# Patient Record
Sex: MALE | Race: WHITE | Employment: OTHER | URBAN - METROPOLITAN AREA
[De-identification: names, ages, dates, MRNs, and addresses within clinical notes are randomized per-mention and may not be internally consistent; named-entity substitution may affect disease eponyms.]

---

## 2017-04-07 ENCOUNTER — TRANSFERRED RECORDS (OUTPATIENT)
Dept: HEALTH INFORMATION MANAGEMENT | Facility: CLINIC | Age: 77
End: 2017-04-07

## 2017-12-13 ENCOUNTER — TELEPHONE (OUTPATIENT)
Dept: FAMILY MEDICINE | Facility: CLINIC | Age: 77
End: 2017-12-13

## 2017-12-13 NOTE — TELEPHONE ENCOUNTER
12/13/2017    Call Regarding ReattributionPhysical    Attempt 1    Message on voicemail    Comments:       Outreach   Bell Raya

## 2018-02-21 NOTE — TELEPHONE ENCOUNTER
2/21/2018    Call Regarding ReattributionPhysical       Attempt 2    Message on voicemail     Comments:       Outreach   Aliyah Ruiz

## 2018-03-17 NOTE — TELEPHONE ENCOUNTER
3/16/2018    Call Regarding ReattributionPhysical       Attempt 3    Message on voicemail     Comments:       Outreach   SV

## 2018-06-26 ENCOUNTER — TRANSFERRED RECORDS (OUTPATIENT)
Dept: HEALTH INFORMATION MANAGEMENT | Facility: CLINIC | Age: 78
End: 2018-06-26

## 2018-08-23 ENCOUNTER — OFFICE VISIT (OUTPATIENT)
Dept: FAMILY MEDICINE | Facility: CLINIC | Age: 78
End: 2018-08-23
Payer: COMMERCIAL

## 2018-08-23 VITALS
BODY MASS INDEX: 27.44 KG/M2 | HEART RATE: 52 BPM | WEIGHT: 185.3 LBS | TEMPERATURE: 97.9 F | HEIGHT: 69 IN | RESPIRATION RATE: 14 BRPM | SYSTOLIC BLOOD PRESSURE: 112 MMHG | DIASTOLIC BLOOD PRESSURE: 70 MMHG | OXYGEN SATURATION: 97 %

## 2018-08-23 DIAGNOSIS — Z12.5 SCREENING FOR PROSTATE CANCER: ICD-10-CM

## 2018-08-23 DIAGNOSIS — Z13.6 CARDIOVASCULAR SCREENING; LDL GOAL LESS THAN 130: ICD-10-CM

## 2018-08-23 DIAGNOSIS — Z00.00 ROUTINE GENERAL MEDICAL EXAMINATION AT A HEALTH CARE FACILITY: Primary | ICD-10-CM

## 2018-08-23 LAB
ALBUMIN SERPL-MCNC: 4.1 G/DL (ref 3.4–5)
ALP SERPL-CCNC: 65 U/L (ref 40–150)
ALT SERPL W P-5'-P-CCNC: 18 U/L (ref 0–70)
ANION GAP SERPL CALCULATED.3IONS-SCNC: 6 MMOL/L (ref 3–14)
AST SERPL W P-5'-P-CCNC: 14 U/L (ref 0–45)
BILIRUB SERPL-MCNC: 1.7 MG/DL (ref 0.2–1.3)
BUN SERPL-MCNC: 18 MG/DL (ref 7–30)
CALCIUM SERPL-MCNC: 8.7 MG/DL (ref 8.5–10.1)
CHLORIDE SERPL-SCNC: 105 MMOL/L (ref 94–109)
CHOLEST SERPL-MCNC: 183 MG/DL
CO2 SERPL-SCNC: 31 MMOL/L (ref 20–32)
CREAT SERPL-MCNC: 0.8 MG/DL (ref 0.66–1.25)
ERYTHROCYTE [DISTWIDTH] IN BLOOD BY AUTOMATED COUNT: 13.6 % (ref 10–15)
GFR SERPL CREATININE-BSD FRML MDRD: >90 ML/MIN/1.7M2
GLUCOSE SERPL-MCNC: 93 MG/DL (ref 70–99)
HCT VFR BLD AUTO: 45.6 % (ref 40–53)
HDLC SERPL-MCNC: 50 MG/DL
HGB BLD-MCNC: 15.1 G/DL (ref 13.3–17.7)
LDLC SERPL CALC-MCNC: 117 MG/DL
MCH RBC QN AUTO: 31.7 PG (ref 26.5–33)
MCHC RBC AUTO-ENTMCNC: 33.1 G/DL (ref 31.5–36.5)
MCV RBC AUTO: 96 FL (ref 78–100)
NONHDLC SERPL-MCNC: 133 MG/DL
PLATELET # BLD AUTO: 210 10E9/L (ref 150–450)
POTASSIUM SERPL-SCNC: 4.1 MMOL/L (ref 3.4–5.3)
PROT SERPL-MCNC: 7.1 G/DL (ref 6.8–8.8)
PSA SERPL-ACNC: 1.28 UG/L (ref 0–4)
RBC # BLD AUTO: 4.76 10E12/L (ref 4.4–5.9)
SODIUM SERPL-SCNC: 142 MMOL/L (ref 133–144)
TRIGL SERPL-MCNC: 81 MG/DL
WBC # BLD AUTO: 6.5 10E9/L (ref 4–11)

## 2018-08-23 PROCEDURE — G0103 PSA SCREENING: HCPCS | Performed by: FAMILY MEDICINE

## 2018-08-23 PROCEDURE — 36415 COLL VENOUS BLD VENIPUNCTURE: CPT | Performed by: FAMILY MEDICINE

## 2018-08-23 PROCEDURE — 80053 COMPREHEN METABOLIC PANEL: CPT | Performed by: FAMILY MEDICINE

## 2018-08-23 PROCEDURE — 85027 COMPLETE CBC AUTOMATED: CPT | Performed by: FAMILY MEDICINE

## 2018-08-23 PROCEDURE — 99397 PER PM REEVAL EST PAT 65+ YR: CPT | Performed by: FAMILY MEDICINE

## 2018-08-23 PROCEDURE — 80061 LIPID PANEL: CPT | Performed by: FAMILY MEDICINE

## 2018-08-23 NOTE — MR AVS SNAPSHOT
After Visit Summary   8/23/2018    Abdon Senior    MRN: [id number]           Patient Information     Date Of Birth          1940        Visit Information        Provider Department      8/23/2018 10:40 AM Rex Calles MD [facility]        Today's Diagnoses     Routine general medical examination at a health care facility    -  1    CARDIOVASCULAR SCREENING; LDL GOAL LESS THAN 130        Screening for prostate cancer          Care Instructions      Preventive Health Recommendations:   Male Ages 65 and over    Yearly exam:             See your health care provider every year in order to  o   Review health changes.   o   Discuss preventive care.    o   Review your medicines if your doctor has prescribed any.    Talk with your health care provider about whether you should have a test to screen for prostate cancer (PSA).    Every 3 years, have a diabetes test (fasting glucose). If you are at risk for diabetes, you should have this test more often.    Every 5 years, have a cholesterol test. Have this test more often if you are at risk for high cholesterol or heart disease.     Every 10 years, have a colonoscopy. Or, have a yearly FIT test (stool test). These exams will check for colon cancer.    Talk to with your health care provider about screening for Abdominal Aortic Aneurysm if you have a family history of AAA or have a history of smoking.    Shots:     Get a flu shot each year.     Get a tetanus shot every 10 years.     Talk to your doctor about your pneumonia vaccines. There are now two you should receive - Pneumovax (PPSV 23) and Prevnar (PCV 13).     Talk to your pharmacist about a shingles vaccine.     Talk to your doctor about the hepatitis B vaccine.  Nutrition:     Eat at least 5 servings of fruits and vegetables each day.     Eat whole-grain bread, whole-wheat pasta and brown rice instead of white grains and rice.     Get adequate Calcium and Vitamin D.  "  Lifestyle    Exercise for at least 150 minutes a week (30 minutes a day, 5 days a week). This will help you control your weight and prevent disease.     Limit alcohol to one drink per day.     No smoking.     Wear sunscreen to prevent skin cancer.     See your dentist every six months for an exam and cleaning.     See your eye doctor every 1 to 2 years to screen for conditions such as glaucoma, macular degeneration, cataracts, etc           Follow-ups after your visit        Who to contact     If you have questions or need follow up information about today's clinic visit or your schedule please contact [facility] directly at [phone].  Normal or non-critical lab and imaging results will be communicated to you by MyChart, letter or phone within 4 business days after the clinic has received the results. If you do not hear from us within 7 days, please contact the clinic through MyChart or phone. If you have a critical or abnormal lab result, we will notify you by phone as soon as possible.  Submit refill requests through [employer] or call your pharmacy and they will forward the refill request to us. Please allow 3 business days for your refill to be completed.          Additional Information About Your Visit        Care EveryWhere ID     This is your Care EveryWhere ID. This could be used by other organizations to access your [facility] medical records  [id number]        Your Vitals Were     Pulse Temperature Respirations Height Pulse Oximetry BMI (Body Mass Index)    52 97.9  F (36.6  C) (Temporal) 14 5' 9.49\" (1.765 m) 97% 26.98 kg/m2       Blood Pressure from Last 3 Encounters:   08/23/18 112/70   07/08/15 100/70   03/23/15 110/70    Weight from Last 3 Encounters:   08/23/18 185 lb 4.8 oz (84.1 kg)   07/08/15 204 lb (92.5 kg)   03/23/15 213 lb (96.6 kg)              We Performed the Following     CBC with platelets     Comprehensive metabolic panel (BMP + Alb, Alk Phos, ALT, AST, Total. Bili, " TP)     Lipid panel reflex to direct LDL Fasting     PSA, screen        Primary Care Provider Office Phone # Fax #    Rex Calles [phone]       [address] [facility] [address]        Equal Access to Services     TROY RÍOS : Hadsunny jo tomao Soomaali, waaxda luqadaha, qaybta kaalmada adeegyada, su isaak ritobailey santillan kylahchana griffiths. So [facility] [phone].    ATENCIÓN: Si habla español, tiene a smith disposición servicios gratuitos de asistencia lingüística. Llame al [phone].    We comply with applicable federal civil rights laws and [place] laws. We do not discriminate on the basis of race, color, national origin, age, disability, sex, sexual orientation, or gender identity.            Thank you!     Thank you for choosing [facility]  for your care. Our goal is always to provide you with excellent care. Hearing back from our patients is one way we can continue to improve our services. Please take a few minutes to complete the written survey that you may receive in the mail after your visit with us. Thank you!             Your Updated Medication List - Protect others around you: Learn how to safely use, store and throw away your medicines at www.disposemymeds.org.          This list is accurate as of 8/23/18  1:23 PM.  Always use your most recent med list.                   Brand Name Dispense Instructions for use Diagnosis    ALEVE 220 MG capsule   Generic drug:  naproxen sodium      Take 220 mg by mouth 2 times daily (with meals).        ascorbic acid 500 MG tablet    VITAMIN C     1 daily        aspirin 325 MG tablet      Take by mouth daily

## 2018-08-23 NOTE — PROGRESS NOTES
SUBJECTIVE:   CC: Abdon Senior is an 78 year old male who presents for preventative health visit.     Healthy Habits:    Do you get at least three servings of calcium containing foods daily (dairy, green leafy vegetables, etc.)? yes    Amount of exercise or daily activities, outside of work: 7 day(s) per week    Problems taking medications regularly No    Medication side effects: No    Have you had an eye exam in the past two years? yes    Do you see a dentist twice per year? no    Do you have sleep apnea, excessive snoring or daytime drowsiness?no           Today's PHQ-2 Score:   PHQ-2 ( 1999 Pf[employer]) 7/8/2015 5/7/2014   Q1: Little interest or pleasure in doing things 0 0   Q2: Feeling down, depressed or hopeless 0 0   PHQ-2 Score 0 0       Abuse: Current or Past(Physical, Sexual or Emotional)- No  Do you feel safe in your environment - Yes    Social History   Substance Use Topics     Smoking status: Never Smoker     Smokeless tobacco: Never Used     Alcohol use No      If you drink alcohol do you typically have >3 drinks per day or >7 drinks per week? No                      Last PSA:   PSA   Date Value Ref Range Status   05/07/2014 1.32 0 - 4 ug/L Final       Reviewed orders with patient. Reviewed health maintenance and updated orders accordingly - Yes      Reviewed and updated as needed this visit by clinical staff         Reviewed and updated as needed this visit by Provider        Past Medical History:   Diagnosis Date     Calculus of kidney      Other and unspecified mitral valve diseases     Significant MR on echo.     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      Past Surgical History:   Procedure Laterality Date     C NONSPECIFIC PROCEDURE  1974    bilateral hernia     C NONSPECIFIC PROCEDURE  early 70's     kidney stone     [facility] COLONOSCOPY W/WO BRUSH/WASH  4/4/2005     [facility] REMOVAL OF LEG VEINS/ULCER       [facility] REMV CATARACT EXTRACAP,INSERT LENS  10/16/2008    Right eye     [facility] REMV CATARACT EXTRACAP,INSERT LENS  3/19/2009    Left eye     LAPAROSCOPIC HERNIORRHAPHY PREPERITONEAL  10/12/2011    Procedure:LAPAROSCOPIC HERNIORRHAPHY PREPERITONEAL; laparoscopic Preperitoneal left inguinal hernia repair; Surgeon:MASON LLANOS; Location:[facility] OR     REPAIR VALVE MITRAL N/A        ROS:  CONSTITUTIONAL: NEGATIVE for fever, chills, change in weight  INTEGUMENTARY/SKIN: NEGATIVE for worrisome rashes, moles or lesions  EYES: NEGATIVE for vision changes or irritation  ENT: NEGATIVE for ear, mouth and throat problems  RESP: NEGATIVE for significant cough or SOB  CV: NEGATIVE for chest pain, palpitations or peripheral edema  GI: NEGATIVE for nausea, abdominal pain, heartburn, or change in bowel habits   male: negative for dysuria, hematuria, decreased urinary stream, erectile dysfunction, urethral discharge  MUSCULOSKELETAL: NEGATIVE for significant arthralgias or myalgia  NEURO: NEGATIVE for weakness, dizziness or paresthesias  PSYCHIATRIC: NEGATIVE for changes in mood or affect    OBJECTIVE:   There were no vitals taken for this visit.  EXAM:  GENERAL: healthy, alert and no distress  EYES: Eyes grossly normal to inspection, PERRL and conjunctivae and sclerae normal  HENT: ear canals and TM's normal, nose and mouth without ulcers or lesions  NECK: no adenopathy, no asymmetry, masses, or scars and thyroid normal to palpation  RESP: lungs clear to auscultation - no rales, rhonchi or wheezes  CV: regular rate and rhythm, abnormal S1 with probably split S2.  No murmur, click or rub, no peripheral edema and peripheral pulses strong  CV: Well-healed sternotomy scar  ABDOMEN: soft, nontender, no hepatosplenomegaly, no masses and bowel sounds normal  MS: no gross musculoskeletal defects noted, no edema  SKIN: no suspicious lesions or  rashes  NEURO: Normal strength and tone, mentation intact and speech normal  PSYCH: mentation appears normal, affect normal/bright    Diagnostic Test Results:  Results for orders placed or performed in visit on 08/23/18 (from the past 24 hour(s))   CBC with platelets   Result Value Ref Range    WBC 6.5 4.0 - 11.0 10e9/L    RBC Count 4.76 4.4 - 5.9 10e12/L    Hemoglobin 15.1 13.3 - 17.7 g/dL    Hematocrit 45.6 40.0 - 53.0 %    MCV 96 78 - 100 fl    MCH 31.7 26.5 - 33.0 pg    MCHC 33.1 31.5 - 36.5 g/dL    RDW 13.6 10.0 - 15.0 %    Platelet Count 210 150 - 450 10e9/L   Lipid panel reflex to direct LDL Fasting   Result Value Ref Range    Cholesterol 183 <200 mg/dL    Triglycerides 81 <150 mg/dL    HDL Cholesterol 50 >39 mg/dL    LDL Cholesterol Calculated 117 (H) <100 mg/dL    Non HDL Cholesterol 133 (H) <130 mg/dL   PSA, screen   Result Value Ref Range    PSA 1.28 0 - 4 ug/L   Comprehensive metabolic panel (BMP + Alb, Alk Phos, ALT, AST, Total. Bili, TP)   Result Value Ref Range    Sodium 142 133 - 144 mmol/L    Potassium 4.1 3.4 - 5.3 mmol/L    Chloride 105 94 - 109 mmol/L    Carbon Dioxide 31 20 - 32 mmol/L    Anion Gap 6 3 - 14 mmol/L    Glucose 93 70 - 99 mg/dL    Urea Nitrogen 18 7 - 30 mg/dL    Creatinine 0.80 0.66 - 1.25 mg/dL    GFR Estimate >90 >60 mL/min/1.7m2    GFR Estimate If Black >90 >60 mL/min/1.7m2    Calcium 8.7 8.5 - 10.1 mg/dL    Bilirubin Total 1.7 (H) 0.2 - 1.3 mg/dL    Albumin 4.1 3.4 - 5.0 g/dL    Protein Total 7.1 6.8 - 8.8 g/dL    Alkaline Phosphatase 65 40 - 150 U/L    ALT 18 0 - 70 U/L    AST 14 0 - 45 U/L       ASSESSMENT/PLAN:   1. Routine general medical examination at a health care facility  Generally healthy.  Travels here from [place] for this physical.  He is in with a cardiologist there.  He has not had any chest pain and enjoys excellent physical activity without dyspnea.    2. CARDIOVASCULAR SCREENING; LDL GOAL LESS THAN 130  We will notify him of his lab tests.  - CBC with  "platelets  - Lipid panel reflex to direct LDL Fasting  - Comprehensive metabolic panel (BMP + Alb, Alk Phos, ALT, AST, Total. Bili, TP)    3. Screening for prostate cancer  Notify.  - PSA, screen    COUNSELING:  Reviewed preventive health counseling, as reflected in patient instructions       Regular exercise       Healthy diet/nutrition       Hearing screening    BP Readings from Last 1 Encounters:   07/08/15 100/70     Estimated body mass index is 28.86 kg/(m^2) as calculated from the following:    Height as of 7/8/15: 5' 10.5\" (1.791 m).    Weight as of 7/8/15: 204 lb (92.5 kg).           reports that he has never smoked. He has never used smokeless tobacco.      Counseling Resources:  ATP IV Guidelines  Pooled Cohorts Equation Calculator  FRAX Risk Assessment  ICSI Preventive Guidelines  Dietary Guidelines for Americans, 2010  USDA's MyPlate  ASA Prophylaxis  Lung CA Screening    Rex Calles MD  [facility]  "

## 2018-08-23 NOTE — LETTER
August 24, 2018      Abdon Al Parrish  [address] [place] [id number]        Dear ,    We are writing to inform you of your test results.    Labs are generally good with your cholesterol being 183 your PSA was fine at 1.28 chemistry panel showed normal blood sugar liver tests and kidney tests again your bilirubin was slightly up at 1.71.3 being the upper end of normal.  But this has been up between 1.9 and 1.3 for the last 6 years. 1.7  is the same as  3 years ago when we checked.     Resulted Orders   CBC with platelets   Result Value Ref Range    WBC 6.5 4.0 - 11.0 10e9/L    RBC Count 4.76 4.4 - 5.9 10e12/L    Hemoglobin 15.1 13.3 - 17.7 g/dL    Hematocrit 45.6 40.0 - 53.0 %    MCV 96 78 - 100 fl    MCH 31.7 26.5 - 33.0 pg    MCHC 33.1 31.5 - 36.5 g/dL    RDW 13.6 10.0 - 15.0 %    Platelet Count 210 150 - 450 10e9/L   Lipid panel reflex to direct LDL Fasting   Result Value Ref Range    Cholesterol 183 <200 mg/dL    Triglycerides 81 <150 mg/dL      Comment:      Fasting specimen    HDL Cholesterol 50 >39 mg/dL    LDL Cholesterol Calculated 117 (H) <100 mg/dL      Comment:      Above desirable:  100-129 mg/dl  Borderline High:  130-159 mg/dL  High:             160-189 mg/dL  Very high:       >189 mg/dl      Non HDL Cholesterol 133 (H) <130 mg/dL      Comment:      Above Desirable:  130-159 mg/dl  Borderline high:  160-189 mg/dl  High:             190-219 mg/dl  Very high:       >219 mg/dl     PSA, screen   Result Value Ref Range    PSA 1.28 0 - 4 ug/L      Comment:      Assay Method:  Chemiluminescence using Siemens Vista analyzer   Comprehensive metabolic panel (BMP + Alb, Alk Phos, ALT, AST, Total. Bili, TP)   Result Value Ref Range    Sodium 142 133 - 144 mmol/L    Potassium 4.1 3.4 - 5.3 mmol/L    Chloride 105 94 - 109 mmol/L    Carbon Dioxide 31 20 - 32 mmol/L    Anion Gap 6 3 - 14 mmol/L    Glucose 93 70 - 99 mg/dL      Comment:      Fasting specimen    Urea Nitrogen 18 7 - 30 mg/dL     Creatinine 0.80 0.66 - 1.25 mg/dL    GFR Estimate >90 >60 mL/min/1.7m2      Comment:      Non  GFR Calc    GFR Estimate If Black >90 >60 mL/min/1.7m2      Comment:       GFR Calc    Calcium 8.7 8.5 - 10.1 mg/dL    Bilirubin Total 1.7 (H) 0.2 - 1.3 mg/dL    Albumin 4.1 3.4 - 5.0 g/dL    Protein Total 7.1 6.8 - 8.8 g/dL    Alkaline Phosphatase 65 40 - 150 U/L    ALT 18 0 - 70 U/L    AST 14 0 - 45 U/L       If you have any questions or concerns, please call the clinic at the number listed above.       Sincerely,        Rex Calles MD

## 2018-08-24 NOTE — PROGRESS NOTES
Labs are generally good with your cholesterol being 183 your PSA was fine at 1.28 chemistry panel showed normal blood sugar liver tests and kidney tests again your bilirubin was slightly up at 1.71.3 being the upper end of normal.  But this has been up between 1.9 and 1.3 for the last 6 years. 1.7  is the same as  3 years ago when we checked.

## 2019-09-12 ENCOUNTER — TELEPHONE (OUTPATIENT)
Dept: FAMILY MEDICINE | Facility: CLINIC | Age: 79
End: 2019-09-12

## 2019-09-12 ENCOUNTER — OFFICE VISIT (OUTPATIENT)
Dept: FAMILY MEDICINE | Facility: CLINIC | Age: 79
End: 2019-09-12
Payer: COMMERCIAL

## 2019-09-12 VITALS
TEMPERATURE: 97.8 F | OXYGEN SATURATION: 98 % | WEIGHT: 197.1 LBS | DIASTOLIC BLOOD PRESSURE: 52 MMHG | HEIGHT: 71 IN | HEART RATE: 38 BPM | SYSTOLIC BLOOD PRESSURE: 110 MMHG | BODY MASS INDEX: 27.59 KG/M2

## 2019-09-12 DIAGNOSIS — H60.62 CHRONIC OTITIS EXTERNA OF LEFT EAR, UNSPECIFIED TYPE: ICD-10-CM

## 2019-09-12 DIAGNOSIS — I47.9 PAROXYSMAL TACHYCARDIA (H): ICD-10-CM

## 2019-09-12 DIAGNOSIS — I49.3 PVC'S (PREMATURE VENTRICULAR CONTRACTIONS): ICD-10-CM

## 2019-09-12 DIAGNOSIS — Z00.01 ENCOUNTER FOR GENERAL ADULT MEDICAL EXAMINATION WITH ABNORMAL FINDINGS: ICD-10-CM

## 2019-09-12 DIAGNOSIS — Z12.5 SCREENING FOR PROSTATE CANCER: ICD-10-CM

## 2019-09-12 DIAGNOSIS — Z13.6 CARDIOVASCULAR SCREENING; LDL GOAL LESS THAN 130: Primary | ICD-10-CM

## 2019-09-12 LAB
ALBUMIN SERPL-MCNC: 4.1 G/DL (ref 3.4–5)
ALP SERPL-CCNC: 63 U/L (ref 40–150)
ALT SERPL W P-5'-P-CCNC: 20 U/L (ref 0–70)
ANION GAP SERPL CALCULATED.3IONS-SCNC: 5 MMOL/L (ref 3–14)
AST SERPL W P-5'-P-CCNC: 15 U/L (ref 0–45)
BILIRUB SERPL-MCNC: 1.9 MG/DL (ref 0.2–1.3)
BUN SERPL-MCNC: 16 MG/DL (ref 7–30)
CALCIUM SERPL-MCNC: 8.7 MG/DL (ref 8.5–10.1)
CHLORIDE SERPL-SCNC: 108 MMOL/L (ref 94–109)
CHOLEST SERPL-MCNC: 171 MG/DL
CO2 SERPL-SCNC: 29 MMOL/L (ref 20–32)
CREAT SERPL-MCNC: 0.89 MG/DL (ref 0.66–1.25)
GFR SERPL CREATININE-BSD FRML MDRD: 81 ML/MIN/{1.73_M2}
GLUCOSE SERPL-MCNC: 92 MG/DL (ref 70–99)
HDLC SERPL-MCNC: 50 MG/DL
LDLC SERPL CALC-MCNC: 104 MG/DL
NONHDLC SERPL-MCNC: 121 MG/DL
POTASSIUM SERPL-SCNC: 4.2 MMOL/L (ref 3.4–5.3)
PROT SERPL-MCNC: 7 G/DL (ref 6.8–8.8)
PSA SERPL-ACNC: 1.09 UG/L (ref 0–4)
SODIUM SERPL-SCNC: 142 MMOL/L (ref 133–144)
TRIGL SERPL-MCNC: 87 MG/DL
TSH SERPL DL<=0.005 MIU/L-ACNC: 1.15 MU/L (ref 0.4–4)

## 2019-09-12 PROCEDURE — 84443 ASSAY THYROID STIM HORMONE: CPT | Performed by: FAMILY MEDICINE

## 2019-09-12 PROCEDURE — 93000 ELECTROCARDIOGRAM COMPLETE: CPT | Performed by: FAMILY MEDICINE

## 2019-09-12 PROCEDURE — 36415 COLL VENOUS BLD VENIPUNCTURE: CPT | Performed by: FAMILY MEDICINE

## 2019-09-12 PROCEDURE — G0103 PSA SCREENING: HCPCS | Performed by: FAMILY MEDICINE

## 2019-09-12 PROCEDURE — 99214 OFFICE O/P EST MOD 30 MIN: CPT | Mod: 25 | Performed by: FAMILY MEDICINE

## 2019-09-12 PROCEDURE — 80061 LIPID PANEL: CPT | Performed by: FAMILY MEDICINE

## 2019-09-12 PROCEDURE — G0438 PPPS, INITIAL VISIT: HCPCS | Performed by: FAMILY MEDICINE

## 2019-09-12 PROCEDURE — 80053 COMPREHEN METABOLIC PANEL: CPT | Performed by: FAMILY MEDICINE

## 2019-09-12 RX ORDER — NEOMYCIN SULFATE, POLYMYXIN B SULFATE AND HYDROCORTISONE 10; 3.5; 1 MG/ML; MG/ML; [USP'U]/ML
4 SUSPENSION/ DROPS AURICULAR (OTIC) 4 TIMES DAILY
Qty: 10 ML | Refills: 0 | Status: SHIPPED | OUTPATIENT
Start: 2019-09-12

## 2019-09-12 ASSESSMENT — ENCOUNTER SYMPTOMS
APPETITE CHANGE: 0
EYE REDNESS: 0
FACIAL SWELLING: 0
ARTHRALGIAS: 0
DECREASED CONCENTRATION: 0
TROUBLE SWALLOWING: 0
FLANK PAIN: 0
HEMATOCHEZIA: 0
DYSURIA: 0
ADENOPATHY: 0
EYE PAIN: 0
ACTIVITY CHANGE: 0
SEIZURES: 0
ABDOMINAL DISTENTION: 0
LIGHT-HEADEDNESS: 0
FREQUENCY: 0
SORE THROAT: 0
POLYDIPSIA: 0
HALLUCINATIONS: 0
SINUS PAIN: 0
COUGH: 0
BRUISES/BLEEDS EASILY: 0
VOMITING: 0
POLYPHAGIA: 0
DIARRHEA: 0
MYALGIAS: 0
TREMORS: 0
FEVER: 0
DIFFICULTY URINATING: 0
EYE DISCHARGE: 0
CONFUSION: 0
DIZZINESS: 0
COLOR CHANGE: 0
PHOTOPHOBIA: 0
CHILLS: 0
HYPERACTIVE: 0
DYSPHORIC MOOD: 0
BACK PAIN: 0
CONSTIPATION: 0
CHEST TIGHTNESS: 0
WEAKNESS: 0
RECTAL PAIN: 0
VOICE CHANGE: 0
EYE ITCHING: 0
ANAL BLEEDING: 0
SLEEP DISTURBANCE: 0
CHOKING: 0
DIAPHORESIS: 0
FACIAL ASYMMETRY: 0
SINUS PRESSURE: 0
UNEXPECTED WEIGHT CHANGE: 0
FATIGUE: 0
SHORTNESS OF BREATH: 0
PALPITATIONS: 0
HEADACHES: 0
NECK STIFFNESS: 0
NAUSEA: 0
HEARTBURN: 0
JOINT SWELLING: 0
APNEA: 0
PARESTHESIAS: 0
HEMATURIA: 0
WHEEZING: 0
RHINORRHEA: 0
STRIDOR: 0
ABDOMINAL PAIN: 0
SPEECH DIFFICULTY: 0
WOUND: 0
NUMBNESS: 0
NERVOUS/ANXIOUS: 0
AGITATION: 0
NECK PAIN: 0

## 2019-09-12 ASSESSMENT — ACTIVITIES OF DAILY LIVING (ADL): CURRENT_FUNCTION: NO ASSISTANCE NEEDED

## 2019-09-12 ASSESSMENT — MIFFLIN-ST. JEOR: SCORE: 1623.23

## 2019-09-12 NOTE — PATIENT INSTRUCTIONS
Patient Education   Personalized Prevention Plan  You are due for the preventive services outlined below.  Your care team is available to assist you in scheduling these services.  If you have already completed any of these items, please share that information with your care team to update in your medical record.  Health Maintenance Due   Topic Date Due     Zoster (Shingles) Vaccine (1 of 2) 03/08/1990     Pneumococcal Vaccine (1 of 2 - PCV13) 03/08/2005     Discuss Advance Care Planning  10/24/2016     PHQ-2  01/01/2019     FALL RISK ASSESSMENT  08/23/2019     Flu Vaccine (1) 09/01/2019     Annual Wellness Visit  08/23/2019

## 2019-09-12 NOTE — LETTER
September 18, 2019      Abdonkylah Melendez Parrish  [address] [place] [id number]        Dear ,    We are writing to inform you of your test results.    Labs show your thyroid test was normal.  PSA normal at 1.09 chemistry panel essentially normal slightly elevated bilirubin but this has been the case for the last 8 years.  Just borderline elevation.  Cholesterol very good at 171.    Resulted Orders   PSA, screen   Result Value Ref Range    PSA 1.09 0 - 4 ug/L      Comment:      Assay Method:  Chemiluminescence using Siemens Vista analyzer   Comprehensive metabolic panel   Result Value Ref Range    Sodium 142 133 - 144 mmol/L    Potassium 4.2 3.4 - 5.3 mmol/L    Chloride 108 94 - 109 mmol/L    Carbon Dioxide 29 20 - 32 mmol/L    Anion Gap 5 3 - 14 mmol/L    Glucose 92 70 - 99 mg/dL    Urea Nitrogen 16 7 - 30 mg/dL    Creatinine 0.89 0.66 - 1.25 mg/dL    GFR Estimate 81 >60 mL/min/[1.73_m2]      Comment:      Non  GFR Calc  Starting 12/18/2018, serum creatinine based estimated GFR (eGFR) will be   calculated using the Chronic Kidney Disease Epidemiology Collaboration   (CKD-EPI) equation.      GFR Estimate If Black >90 >60 mL/min/[1.73_m2]      Comment:       GFR Calc  Starting 12/18/2018, serum creatinine based estimated GFR (eGFR) will be   calculated using the Chronic Kidney Disease Epidemiology Collaboration   (CKD-EPI) equation.      Calcium 8.7 8.5 - 10.1 mg/dL    Bilirubin Total 1.9 (H) 0.2 - 1.3 mg/dL    Albumin 4.1 3.4 - 5.0 g/dL    Protein Total 7.0 6.8 - 8.8 g/dL    Alkaline Phosphatase 63 40 - 150 U/L    ALT 20 0 - 70 U/L    AST 15 0 - 45 U/L   Lipid Profile   Result Value Ref Range    Cholesterol 171 <200 mg/dL    Triglycerides 87 <150 mg/dL    HDL Cholesterol 50 >39 mg/dL    LDL Cholesterol Calculated 104 (H) <100 mg/dL      Comment:      Above desirable:  100-129 mg/dl  Borderline High:  130-159 mg/dL  High:             160-189 mg/dL  Very high:       >189  mg/dl      Non HDL Cholesterol 121 <130 mg/dL   TSH with free T4 reflex   Result Value Ref Range    TSH 1.15 0.40 - 4.00 mU/L       If you have any questions or concerns, please call the clinic at the number listed above.       Sincerely,        Rex Calles MD

## 2019-09-12 NOTE — PROGRESS NOTES
"SUBJECTIVE:   Abdon Senior is a 79 year old male who presents for Preventive Visit.  Are you in the first 12 months of your Medicare coverage?  No      Patient would like his left ear looked at. He states that it has been sore on/off lately.         Healthy Habits:    In general, how would you rate your overall health?  Good    Frequency of exercise:  6-7 days/week    Duration of exercise:  15-30 minutes    Do you usually eat at least 4 servings of fruit and vegetables a day, include whole grains    & fiber and avoid regularly eating high fat or \"junk\" foods?  Yes    Taking medications regularly:  Yes    Barriers to taking medications:  None    Medication side effects:  None    Ability to successfully perform activities of daily living:  No assistance needed    Home Safety:  No safety concerns identified    Hearing Impairment:  No hearing concerns    In the past 6 months, have you been bothered by leaking of urine?  No    In general, how would you rate your overall mental or emotional health?  Good      PHQ-2 Total Score:    Additional concerns today:  Yes    Do you feel safe in your environment? Yes    Do you have a Health Care Directive? Yes: Advance Directive has been received and scanned.      Fall risk  Fallen 2 or more times in the past year?: No  Any fall with injury in the past year?: No  click delete button to remove this line now  Cognitive Screening   1) Repeat 3 items (Leader, Season, Table)    2) Clock draw: NORMAL  3) 3 item recall: Recalls 2 objects   Results: NORMAL clock, 1-2 items recalled: COGNITIVE IMPAIRMENT LESS LIKELY    Mini-CogTM Copyright ADRIANNE Noel. Licensed by the author for use in [facility]; reprinted with permission (nik@.[facility]). All rights reserved.          Reviewed and updated as needed this visit by clinical staff         Reviewed and updated as needed this visit by Provider        Social History     Tobacco Use     Smoking status: Never Smoker     Smokeless " tobacco: Never Used   Substance Use Topics     Alcohol use: No     If you drink alcohol do you typically have >3 drinks per day or >7 drinks per week? No    Alcohol Use 7/8/2015   Prescreen: >3 drinks/day or >7 drinks/week? The patient does not drink >3 drinks per day nor >7 drinks per week.   No flowsheet data found.            Current providers sharing in care for this patient include: Cardiology in [place]  Patient Care Team:  Rex Calles MD as PCP - General  Rex Calles MD as Assigned PCP    The following health maintenance items are reviewed in Epic and correct as of today:  Health Maintenance   Topic Date Due     ZOSTER IMMUNIZATION (1 of 2) 03/08/1990     PNEUMOCOCCAL IMMUNIZATION 65+ LOW/MEDIUM RISK (1 of 2 - PCV13) 03/08/2005     ADVANCE CARE PLANNING  10/24/2016     PHQ-2  01/01/2019     FALL RISK ASSESSMENT  08/23/2019     INFLUENZA VACCINE (1) 09/01/2019     MEDICARE ANNUAL WELLNESS VISIT  08/23/2019     DTAP/TDAP/TD IMMUNIZATION (2 - Td) 03/29/2020     LIPID  08/23/2023     IPV IMMUNIZATION  Aged Out     MENINGITIS IMMUNIZATION  Aged Out           Review of Systems   Constitutional: Negative for activity change, appetite change, chills, diaphoresis, fatigue, fever and unexpected weight change.   HENT: Positive for ear pain. Negative for congestion, dental problem, drooling, ear discharge, facial swelling, hearing loss, mouth sores, nosebleeds, postnasal drip, rhinorrhea, sinus pressure, sinus pain, sneezing, sore throat, tinnitus, trouble swallowing and voice change.    Eyes: Negative for photophobia, pain, discharge, redness, itching and visual disturbance.   Respiratory: Negative for apnea, cough, choking, chest tightness, shortness of breath, wheezing and stridor.    Cardiovascular: Negative for chest pain, palpitations and peripheral edema.   Gastrointestinal: Negative for abdominal distention, abdominal pain, anal bleeding, constipation, diarrhea, heartburn, hematochezia, nausea,  "rectal pain and vomiting.   Endocrine: Negative for cold intolerance, heat intolerance, polydipsia, polyphagia and polyuria.   Genitourinary: Negative for decreased urine volume, difficulty urinating, discharge, dysuria, enuresis, flank pain, frequency, genital sores, hematuria, impotence, penile pain, penile swelling, scrotal swelling, testicular pain and urgency.   Musculoskeletal: Negative for arthralgias, back pain, gait problem, joint swelling, myalgias, neck pain and neck stiffness.   Skin: Negative for color change, pallor, rash and wound.   Allergic/Immunologic: Negative for environmental allergies, food allergies and immunocompromised state.   Neurological: Negative for dizziness, tremors, seizures, syncope, facial asymmetry, speech difficulty, weakness, light-headedness, numbness, headaches and paresthesias.   Hematological: Negative for adenopathy. Does not bruise/bleed easily.   Psychiatric/Behavioral: Negative for agitation, behavioral problems, confusion, decreased concentration, dysphoric mood, hallucinations, mood changes, self-injury, sleep disturbance and suicidal ideas. The patient is not nervous/anxious and is not hyperactive.          OBJECTIVE:   There were no vitals taken for this visit. Estimated body mass index is 26.98 kg/m  as calculated from the following:    Height as of 8/23/18: 1.765 m (5' 9.49\").    Weight as of 8/23/18: 84.1 kg (185 lb 4.8 oz).  Physical Exam  GENERAL: healthy, alert and no distress  EYES: Eyes grossly normal to inspection, PERRL and conjunctivae and sclerae normal  HENT: ear  TM's normal, left canal is a little boggy looking.  Nose and mouth without ulcers or lesions  NECK: no adenopathy, no asymmetry, masses, or scars and thyroid normal to palpation  RESP: lungs clear to auscultation - no rales, rhonchi or wheezes  CV: , peripheral pulses strong, no peripheral edema and split second heart sound and auscultation and palpation is a rate about 40.  Electrocardiogram " shows almost bigeminy PVCs.  ABDOMEN: soft, nontender, no hepatosplenomegaly, no masses and bowel sounds normal  MS: no gross musculoskeletal defects noted, no edema  SKIN: no suspicious lesions or rashes  NEURO: Normal strength and tone, mentation intact and speech normal  PSYCH: mentation appears normal, affect normal/bright    Diagnostic Test Results:  Labs reviewed in Epic  Results for orders placed or performed in visit on 09/12/19 (from the past 24 hour(s))   PSA, screen   Result Value Ref Range    PSA 1.09 0 - 4 ug/L   Comprehensive metabolic panel   Result Value Ref Range    Sodium 142 133 - 144 mmol/L    Potassium 4.2 3.4 - 5.3 mmol/L    Chloride 108 94 - 109 mmol/L    Carbon Dioxide 29 20 - 32 mmol/L    Anion Gap 5 3 - 14 mmol/L    Glucose 92 70 - 99 mg/dL    Urea Nitrogen 16 7 - 30 mg/dL    Creatinine 0.89 0.66 - 1.25 mg/dL    GFR Estimate 81 >60 mL/min/[1.73_m2]    GFR Estimate If Black >90 >60 mL/min/[1.73_m2]    Calcium 8.7 8.5 - 10.1 mg/dL    Bilirubin Total 1.9 (H) 0.2 - 1.3 mg/dL    Albumin 4.1 3.4 - 5.0 g/dL    Protein Total 7.0 6.8 - 8.8 g/dL    Alkaline Phosphatase 63 40 - 150 U/L    ALT 20 0 - 70 U/L    AST 15 0 - 45 U/L   Lipid Profile   Result Value Ref Range    Cholesterol 171 <200 mg/dL    Triglycerides 87 <150 mg/dL    HDL Cholesterol 50 >39 mg/dL    LDL Cholesterol Calculated 104 (H) <100 mg/dL    Non HDL Cholesterol 121 <130 mg/dL   TSH with free T4 reflex   Result Value Ref Range    TSH 1.15 0.40 - 4.00 mU/L       ASSESSMENT / PLAN:   1. Encounter for general adult medical examination with abnormal findings  Generally healthy feels very well.  Exercises every day.  - HC FLU VACCINE, INCREASED ANTIGEN, PRESV FREE [29050]    2. PVC's (premature ventricular contractions)  Concerning considering his heart history with mitral valve reconstruction.  We reviewed this with cardiology.  The EKG was abnormal.  They felt the following up with his cardiologist in the local area is a must.  I  "counseled him about this and he will do this as soon as possible.  - Comprehensive metabolic panel  - TSH with free T4 reflex  - EKG 12-lead complete w/read - Clinics    3. Chronic otitis externa of left ear, unspecified type  He has had trouble with his ear and some drainage.  He has had Cortisporin in the past and this is been helpful.  - neomycin-polymyxin-hydrocortisone (CORTISPORIN) 3.5-93070-5 otic suspension; Place 4 drops Into the left ear 4 times daily  Dispense: 10 mL; Refill: 0    4. CARDIOVASCULAR SCREENING; LDL GOAL LESS THAN 130  We will notify with results.  - Lipid Profile    5. Screening for prostate cancer  Will notify with results.  - PSA, screen    6. Paroxysmal tachycardia (H)   Needed to get a TSH and this was 1 of the only diagnoses that was allowed he does not have tachycardia.  We will notify with results of the TSH  - TSH with free T4 reflex    End of Life Planning:  Patient currently has an advanced directive: No.  I have verified the patient's ablity to prepare an advanced directive/make health care decisions.  Literature was provided to assist patient in preparing an advanced directive.    COUNSELING:  Reviewed preventive health counseling, as reflected in patient instructions       Regular exercise       Healthy diet/nutrition       Vision screening    Estimated body mass index is 26.98 kg/m  as calculated from the following:    Height as of 8/23/18: 1.765 m (5' 9.49\").    Weight as of 8/23/18: 84.1 kg (185 lb 4.8 oz).         reports that he has never smoked. He has never used smokeless tobacco.      Appropriate preventive services were discussed with this patient, including applicable screening as appropriate for cardiovascular disease, diabetes, osteopenia/osteoporosis, and glaucoma.  As appropriate for age/gender, discussed screening for colorectal cancer, prostate cancer, breast cancer, and cervical cancer. Checklist reviewing preventive services available has been given to the " patient.    Reviewed patients plan of care and provided an AVS. The Basic Care Plan (routine screening as documented in Health Maintenance) for Abdon meets the Care Plan requirement. This Care Plan has been established and reviewed with the Patient.    Counseling Resources:  ATP IV Guidelines  Pooled Cohorts Equation Calculator  Breast Cancer Risk Calculator  FRAX Risk Assessment  ICSI Preventive Guidelines  Dietary Guidelines for Americans, 2010  [place]'s MyPlate  ASA Prophylaxis  Lung CA Screening    Rex Calles MD  [facility]    Identified Health Risks:

## 2019-09-18 ENCOUNTER — TELEPHONE (OUTPATIENT)
Dept: FAMILY MEDICINE | Facility: CLINIC | Age: 79
End: 2019-09-18

## 2019-09-18 NOTE — TELEPHONE ENCOUNTER
Patient was informed that his labs show your thyroid test normal, PSA normal at 1.09 chemistry panel essentially normal slightly elevated bilirubin but this has been the case for the last 8 years. Just borderline elevated. Cholesterol very good at 171.    Chloe Hernandez, CMA

## 2019-09-18 NOTE — TELEPHONE ENCOUNTER
----- Message from Rex Calles MD sent at 9/18/2019  8:15 AM CDT -----  Labs show your thyroid test was normal.  PSA normal at 1.09 chemistry panel essentially normal slightly elevated bilirubin but this has been the case for the last 8 years.  Just borderline elevation.  Cholesterol very good at 171

## 2020-01-08 ENCOUNTER — TELEPHONE (OUTPATIENT)
Dept: FAMILY MEDICINE | Facility: CLINIC | Age: 80
End: 2020-01-08

## 2020-01-08 NOTE — TELEPHONE ENCOUNTER
Reason for Call:  Medication or medication refill:    Do you use a [facility]?  Name of the pharmacy and phone number for the current request:  [facility] in [place] on [address]    Name of the medication requested: inhaler    Other request: was told by a pharmacist that he should be using an inhaler for his wheezing. Has not been seen by a doctor for this. Was told by a pharmacist to call and ask for a script and might not need to be seen. I did inform him that the doctor would need to see him for this before he could give him a new medication that he has never used before. He still asked me to send the message anyway.      Can we leave a detailed message on this number? YES    Phone number patient can be reached at: Home number on file [phone] (home)    Best Time: any    Call taken on 1/8/2020 at 2:01 PM by Luzma Dunn CNA

## 2020-01-08 NOTE — TELEPHONE ENCOUNTER
The patient called back and information has been given. Declined making an appt  Thank you,  Luciana Dunn   for [facility]

## 2020-01-08 NOTE — TELEPHONE ENCOUNTER
Patient needs to be seen to discuss this.  He last saw him in Sept and wheezing was never discussed.

## 2020-07-31 ENCOUNTER — TELEPHONE (OUTPATIENT)
Dept: FAMILY MEDICINE | Facility: CLINIC | Age: 80
End: 2020-07-31

## 2020-07-31 NOTE — TELEPHONE ENCOUNTER
Reason for Call:  Other call back    Detailed comments: Abdon is wanting Shira to call him if possible. (not sure what about) more about what they were to do if they were in contact with covid.     Phone Number Patient can be reached at: Home number on file [phone] (home)    Best Time: anytime    Can we leave a detailed message on this number? YES    Call taken on 7/31/2020 at 4:14 PM by Miranda Seipel Vaccari

## 2020-08-04 NOTE — TELEPHONE ENCOUNTER
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    Please advise  MP/MA

## 2020-08-04 NOTE — TELEPHONE ENCOUNTER
Spoke with Abdon.  I gave him some input on COVID-19 guidelines per the CDC guidelines.    Electronically signed by:  Uriel Ibrahim M.D.  8/4/2020

## 2021-07-23 NOTE — RESULT ENCOUNTER NOTE
Labs show your thyroid test was normal.  PSA normal at 1.09 chemistry panel essentially normal slightly elevated bilirubin but this has been the case for the last 8 years.  Just borderline elevation.  Cholesterol very good at 171 [Follow-Up] : a follow-up visit [Asthma] : asthma

## 2022-03-11 ENCOUNTER — TELEPHONE (OUTPATIENT)
Dept: FAMILY MEDICINE | Facility: CLINIC | Age: 82
End: 2022-03-11

## 2022-03-11 NOTE — TELEPHONE ENCOUNTER
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 They are now starting to run into some problems with visitors not wanting to wear masks and so Abdon was wondering if you would be willing to write him an email in support as to why people should continue to mask for a little longer. They have a board meeting Thursday and he was hoping to have a letter from a medical professional by then to help with their argument to continue the mandate a little longer. His email address is [email or web address]  He also wanted to say thank you very much for taking the time to help.     Phone Number Patient can be reached at: Other phone number:  [phone]    Best Time: any    Can we leave a detailed message on this number? YES    Call taken on 3/11/2022 at 10:47 AM by Ana Harrington

## 2022-03-12 NOTE — TELEPHONE ENCOUNTER
This sort of statement should be coming from a local doctor in his area or his personal physician.  Mask mandates are being lifted all over the country so it is difficult to make blanket statements for him without knowing the community and the rates of COVID in his area or community.  It is really up to their board to vote on a consensus as to whether to continue to wear masks or not.  Masks do help minimize risk of spreading the virus but are not fool proof either if not worn correctly.      Electronically signed by:  Uriel Ibrahim M.D.  3/12/2022